# Patient Record
Sex: MALE | Race: WHITE | Employment: OTHER | URBAN - METROPOLITAN AREA
[De-identification: names, ages, dates, MRNs, and addresses within clinical notes are randomized per-mention and may not be internally consistent; named-entity substitution may affect disease eponyms.]

---

## 2017-12-11 PROBLEM — I48.92 ATRIAL FLUTTER (HCC): Status: ACTIVE | Noted: 2017-12-11

## 2017-12-13 PROBLEM — E78.2 HYPERLIPEMIA, MIXED: Chronic | Status: ACTIVE | Noted: 2017-12-13

## 2017-12-13 PROBLEM — I10 HTN (HYPERTENSION), BENIGN: Chronic | Status: ACTIVE | Noted: 2017-12-13

## 2018-10-02 ENCOUNTER — HOSPITAL ENCOUNTER (EMERGENCY)
Age: 78
Discharge: HOME | End: 2018-10-02
Payer: MEDICARE

## 2018-10-02 ENCOUNTER — APPOINTMENT (OUTPATIENT)
Dept: GENERAL RADIOLOGY | Age: 78
End: 2018-10-02
Payer: MEDICARE

## 2018-10-02 VITALS
HEART RATE: 79 BPM | WEIGHT: 164 LBS | OXYGEN SATURATION: 97 % | RESPIRATION RATE: 14 BRPM | SYSTOLIC BLOOD PRESSURE: 168 MMHG | HEIGHT: 70 IN | DIASTOLIC BLOOD PRESSURE: 76 MMHG | BODY MASS INDEX: 23.48 KG/M2 | TEMPERATURE: 97.7 F

## 2018-10-02 DIAGNOSIS — T18.108A FOREIGN BODY IN ESOPHAGUS, INITIAL ENCOUNTER: Primary | ICD-10-CM

## 2018-10-02 PROCEDURE — 99283 EMERGENCY DEPT VISIT LOW MDM: CPT

## 2018-10-02 PROCEDURE — 6370000000 HC RX 637 (ALT 250 FOR IP): Performed by: PHYSICIAN ASSISTANT

## 2018-10-02 PROCEDURE — 6360000004 HC RX CONTRAST MEDICATION: Performed by: PHYSICIAN ASSISTANT

## 2018-10-02 PROCEDURE — 71046 X-RAY EXAM CHEST 2 VIEWS: CPT

## 2018-10-02 PROCEDURE — A4641 RADIOPHARM DX AGENT NOC: HCPCS | Performed by: PHYSICIAN ASSISTANT

## 2018-10-02 PROCEDURE — 2500000003 HC RX 250 WO HCPCS: Performed by: PHYSICIAN ASSISTANT

## 2018-10-02 PROCEDURE — 74220 X-RAY XM ESOPHAGUS 1CNTRST: CPT

## 2018-10-02 RX ORDER — AZITHROMYCIN 250 MG/1
TABLET, FILM COATED ORAL
Qty: 1 PACKET | Refills: 0 | Status: SHIPPED | OUTPATIENT
Start: 2018-10-02 | End: 2018-10-12

## 2018-10-02 RX ADMIN — ANTACID/ANTIFLATULENT 1 EACH: 380; 550; 10; 10 GRANULE, EFFERVESCENT ORAL at 14:27

## 2018-10-02 RX ADMIN — BARIUM SULFATE 1 TABLET: 700 TABLET ORAL at 14:26

## 2018-10-02 RX ADMIN — BARIUM SULFATE 170 ML: 960 POWDER, FOR SUSPENSION ORAL at 14:27

## 2018-10-02 RX ADMIN — BARIUM SULFATE 140 ML: 980 POWDER, FOR SUSPENSION ORAL at 14:28

## 2018-10-02 ASSESSMENT — PAIN SCALES - GENERAL: PAINLEVEL_OUTOF10: 5

## 2018-10-02 ASSESSMENT — PAIN DESCRIPTION - DESCRIPTORS: DESCRIPTORS: DISCOMFORT

## 2018-10-02 ASSESSMENT — PAIN DESCRIPTION - LOCATION: LOCATION: THROAT

## 2018-10-02 NOTE — ED NOTES
This RN witnessed broken up pill pieces in a tissue that the patient coughed up. Pt states he feels better now  That he coughed up the remaining pill. Explained tot he patient that he can take his pills with applesauce or pudding if he finds it hard to swallow. Pt states that he does not have a hard time swallowing pills but today was talking and it went down the wrong tube.       Leeroy López RN  10/02/18 0774

## 2023-04-25 ENCOUNTER — HOSPITAL ENCOUNTER (OUTPATIENT)
Age: 83
Discharge: HOME | End: 2023-04-27

## 2023-04-25 PROCEDURE — 88305 TISSUE EXAM BY PATHOLOGIST: CPT

## 2023-10-05 ENCOUNTER — APPOINTMENT (OUTPATIENT)
Dept: GENERAL RADIOLOGY | Age: 83
End: 2023-10-05
Payer: MEDICARE

## 2023-10-05 ENCOUNTER — HOSPITAL ENCOUNTER (EMERGENCY)
Age: 83
Discharge: HOME | End: 2023-10-05
Attending: EMERGENCY MEDICINE
Payer: MEDICARE

## 2023-10-05 VITALS
BODY MASS INDEX: 23.62 KG/M2 | SYSTOLIC BLOOD PRESSURE: 170 MMHG | OXYGEN SATURATION: 100 % | DIASTOLIC BLOOD PRESSURE: 69 MMHG | HEIGHT: 70 IN | WEIGHT: 165 LBS | HEART RATE: 92 BPM | TEMPERATURE: 97.1 F | RESPIRATION RATE: 16 BRPM

## 2023-10-05 DIAGNOSIS — Z86.79 HX OF ATRIAL FLUTTER: ICD-10-CM

## 2023-10-05 DIAGNOSIS — R00.2 PALPITATIONS: Primary | ICD-10-CM

## 2023-10-05 LAB
ANION GAP SERPL CALCULATED.3IONS-SCNC: 10 MMOL/L (ref 7–16)
BASOPHILS # BLD: 0.02 K/UL (ref 0–0.2)
BASOPHILS NFR BLD: 0 % (ref 0–2)
BNP SERPL-MCNC: 168 PG/ML (ref 0–450)
BUN SERPL-MCNC: 17 MG/DL (ref 6–23)
CALCIUM SERPL-MCNC: 8.9 MG/DL (ref 8.6–10.2)
CHLORIDE SERPL-SCNC: 100 MMOL/L (ref 98–107)
CO2 SERPL-SCNC: 24 MMOL/L (ref 22–29)
CREAT SERPL-MCNC: 0.8 MG/DL (ref 0.7–1.2)
EKG ATRIAL RATE: 89 BPM
EKG P AXIS: 63 DEGREES
EKG P-R INTERVAL: 226 MS
EKG Q-T INTERVAL: 394 MS
EKG QRS DURATION: 128 MS
EKG QTC CALCULATION (BAZETT): 479 MS
EKG R AXIS: -53 DEGREES
EKG T AXIS: 75 DEGREES
EKG VENTRICULAR RATE: 89 BPM
EOSINOPHIL # BLD: 0.06 K/UL (ref 0.05–0.5)
EOSINOPHILS RELATIVE PERCENT: 1 % (ref 0–6)
ERYTHROCYTE [DISTWIDTH] IN BLOOD BY AUTOMATED COUNT: 12 % (ref 11.5–15)
GFR SERPL CREATININE-BSD FRML MDRD: >60 ML/MIN/1.73M2
GLUCOSE SERPL-MCNC: 189 MG/DL (ref 74–99)
HCT VFR BLD AUTO: 38.6 % (ref 37–54)
HGB BLD-MCNC: 13.1 G/DL (ref 12.5–16.5)
IMM GRANULOCYTES # BLD AUTO: <0.03 K/UL (ref 0–0.58)
IMM GRANULOCYTES NFR BLD: 0 % (ref 0–5)
LYMPHOCYTES NFR BLD: 0.77 K/UL (ref 1.5–4)
LYMPHOCYTES RELATIVE PERCENT: 15 % (ref 20–42)
MCH RBC QN AUTO: 31.3 PG (ref 26–35)
MCHC RBC AUTO-ENTMCNC: 33.9 G/DL (ref 32–34.5)
MCV RBC AUTO: 92.1 FL (ref 80–99.9)
MONOCYTES NFR BLD: 0.71 K/UL (ref 0.1–0.95)
MONOCYTES NFR BLD: 14 % (ref 2–12)
NEUTROPHILS NFR BLD: 70 % (ref 43–80)
NEUTS SEG NFR BLD: 3.61 K/UL (ref 1.8–7.3)
PLATELET, FLUORESCENCE: 135 K/UL (ref 130–450)
PMV BLD AUTO: 9.3 FL (ref 7–12)
POTASSIUM SERPL-SCNC: 3.8 MMOL/L (ref 3.5–5)
RBC # BLD AUTO: 4.19 M/UL (ref 3.8–5.8)
SODIUM SERPL-SCNC: 134 MMOL/L (ref 132–146)
TROPONIN I SERPL HS-MCNC: 15 NG/L (ref 0–11)
TROPONIN I SERPL HS-MCNC: 15 NG/L (ref 0–11)
WBC OTHER # BLD: 5.2 K/UL (ref 4.5–11.5)

## 2023-10-05 PROCEDURE — 93010 ELECTROCARDIOGRAM REPORT: CPT | Performed by: INTERNAL MEDICINE

## 2023-10-05 PROCEDURE — 99285 EMERGENCY DEPT VISIT HI MDM: CPT

## 2023-10-05 PROCEDURE — 71045 X-RAY EXAM CHEST 1 VIEW: CPT

## 2023-10-05 PROCEDURE — 93005 ELECTROCARDIOGRAM TRACING: CPT | Performed by: NURSE PRACTITIONER

## 2023-10-05 PROCEDURE — 84484 ASSAY OF TROPONIN QUANT: CPT

## 2023-10-05 PROCEDURE — 83880 ASSAY OF NATRIURETIC PEPTIDE: CPT

## 2023-10-05 PROCEDURE — 85025 COMPLETE CBC W/AUTO DIFF WBC: CPT

## 2023-10-05 PROCEDURE — 80048 BASIC METABOLIC PNL TOTAL CA: CPT

## 2023-10-05 ASSESSMENT — ENCOUNTER SYMPTOMS
CONSTIPATION: 0
NAUSEA: 0
ABDOMINAL PAIN: 0
SHORTNESS OF BREATH: 0
VOMITING: 0
DIARRHEA: 0

## 2023-10-05 NOTE — ED NOTES
Department of Emergency Medicine  FIRST PROVIDER TRIAGE NOTE             Independent MLP           10/5/23  1:17 PM EDT    Date of Encounter: 10/5/23   MRN: [id number]      HPI: Pola Severance is a 80 y.o. male who presents to the ED for Palpitations (States he can feel his heart racing, denies chest pain. Onset 45 mins prior to arrival. Hx of Afib) and Dizziness    Increased weakness over the day. Having palpitations and dizziness. Has a history of A-fib taking Eliquis. ROS: Negative for fever or cough. PE: Gen Appearance/Constitutional: alert  Musculoskeletal: moves all extremities x 4.  In  wheelchair     Initial Plan of Care:patient to room 16  Electronically signed by KENNETH Prado CNP   DD: 10/5/23       KENNETH Prado CNP  10/05/23 8205

## 2023-10-05 NOTE — ED PROVIDER NOTES
Department of Emergency Medicine   ED  Provider Note    Pt Name: Samaria Galindo         MRN: [id number]         [address] [facility] 1940    Admit Date/RoomTime: 10/5/2023  1:17 PM  ED Room: 16/16      Chief Complaint   Patient presents with    Palpitations     States he can feel his heart racing, denies chest pain. Onset 45 mins prior to arrival. Hx of Afib    Dizziness        HPI     Samaria Galindo is a 80 y.o. male with PMHx of HLD, hypertension, atrial flutter, IBS presenting to the ED for palpitations. Patient reports that about an hour ago he had finished eating lunch and noticed he felt lightheaded and that he was having palpitations. Patient feeling weaker than usual.  He denies falls, able to dress by himself, but just overall felt abnormal.  Patient denies chest pain, shortness of breath, abdominal pain, nausea, vomiting. Patient reports diarrhea that has been ongoing. He has a history of IBS. He had polyps removed on colonoscopy in April. He reports he has been having loose stools that are mucousy for 1 week. He has been able to eat and drink like normal, but wonders if he is a little dehydrated from the diarrhea. Patient reports that 1 week ago he finished 5 weeks of prednisone for an ophthalmologic concern. Since being in the ED, the patient's palpitations and lightheadedness have resolved. Patient takes metoprolol succinate and Eliquis for his history of atrial flutter. He reports he has not missed doses of either medicine. Review of Systems   Constitutional:  Negative for chills and fever. Respiratory:  Negative for shortness of breath. Cardiovascular:  Negative for chest pain and leg swelling. Gastrointestinal:  Negative for abdominal pain, constipation, diarrhea, nausea and vomiting. Neurological:  Negative for dizziness, weakness and light-headedness. Physical Exam  Constitutional:       General: He is not in acute distress.   HENT:      Head: Normocephalic and

## 2024-01-07 ENCOUNTER — APPOINTMENT (OUTPATIENT)
Dept: GENERAL RADIOLOGY | Age: 84
End: 2024-01-07
Payer: MEDICARE

## 2024-01-07 ENCOUNTER — HOSPITAL ENCOUNTER (EMERGENCY)
Age: 84
Discharge: HOME | End: 2024-01-07
Attending: EMERGENCY MEDICINE
Payer: MEDICARE

## 2024-01-07 VITALS
OXYGEN SATURATION: 97 % | RESPIRATION RATE: 18 BRPM | HEIGHT: 70 IN | TEMPERATURE: 98.2 F | HEART RATE: 63 BPM | WEIGHT: 164 LBS | BODY MASS INDEX: 23.48 KG/M2 | SYSTOLIC BLOOD PRESSURE: 153 MMHG | DIASTOLIC BLOOD PRESSURE: 73 MMHG

## 2024-01-07 DIAGNOSIS — R00.2 PALPITATIONS: Primary | ICD-10-CM

## 2024-01-07 LAB
ALBUMIN SERPL-MCNC: 4.1 G/DL (ref 3.5–5.2)
ALP SERPL-CCNC: 66 U/L (ref 40–129)
ALT SERPL-CCNC: 13 U/L (ref 0–40)
ANION GAP SERPL CALCULATED.3IONS-SCNC: 9 MMOL/L (ref 7–16)
AST SERPL-CCNC: 20 U/L (ref 0–39)
BASOPHILS # BLD: 0.03 K/UL (ref 0–0.2)
BASOPHILS NFR BLD: 1 % (ref 0–2)
BILIRUB SERPL-MCNC: 0.6 MG/DL (ref 0–1.2)
BUN SERPL-MCNC: 16 MG/DL (ref 6–23)
CALCIUM SERPL-MCNC: 9 MG/DL (ref 8.6–10.2)
CHLORIDE SERPL-SCNC: 99 MMOL/L (ref 98–107)
CO2 SERPL-SCNC: 26 MMOL/L (ref 22–29)
CREAT SERPL-MCNC: 0.8 MG/DL (ref 0.7–1.2)
EOSINOPHIL # BLD: 0.09 K/UL (ref 0.05–0.5)
EOSINOPHILS RELATIVE PERCENT: 2 % (ref 0–6)
ERYTHROCYTE [DISTWIDTH] IN BLOOD BY AUTOMATED COUNT: 11.9 % (ref 11.5–15)
GFR SERPL CREATININE-BSD FRML MDRD: >60 ML/MIN/1.73M2
GLUCOSE SERPL-MCNC: 154 MG/DL (ref 74–99)
HCT VFR BLD AUTO: 40.5 % (ref 37–54)
HGB BLD-MCNC: 13.8 G/DL (ref 12.5–16.5)
IMM GRANULOCYTES # BLD AUTO: <0.03 K/UL (ref 0–0.58)
IMM GRANULOCYTES NFR BLD: 0 % (ref 0–5)
LYMPHOCYTES NFR BLD: 0.77 K/UL (ref 1.5–4)
LYMPHOCYTES RELATIVE PERCENT: 14 % (ref 20–42)
MCH RBC QN AUTO: 31.5 PG (ref 26–35)
MCHC RBC AUTO-ENTMCNC: 34.1 G/DL (ref 32–34.5)
MCV RBC AUTO: 92.5 FL (ref 80–99.9)
MONOCYTES NFR BLD: 0.5 K/UL (ref 0.1–0.95)
MONOCYTES NFR BLD: 9 % (ref 2–12)
NEUTROPHILS NFR BLD: 74 % (ref 43–80)
NEUTS SEG NFR BLD: 4.09 K/UL (ref 1.8–7.3)
PLATELET, FLUORESCENCE: 131 K/UL (ref 130–450)
PMV BLD AUTO: 9.6 FL (ref 7–12)
POTASSIUM SERPL-SCNC: 3.8 MMOL/L (ref 3.5–5)
PROT SERPL-MCNC: 6.9 G/DL (ref 6.4–8.3)
RBC # BLD AUTO: 4.38 M/UL (ref 3.8–5.8)
SODIUM SERPL-SCNC: 134 MMOL/L (ref 132–146)
T4 FREE SERPL-MCNC: 1 NG/DL (ref 0.9–1.7)
TROPONIN I SERPL HS-MCNC: 15 NG/L (ref 0–11)
TROPONIN I SERPL HS-MCNC: 16 NG/L (ref 0–11)
TSH SERPL DL<=0.05 MIU/L-ACNC: 1.87 UIU/ML (ref 0.27–4.2)
WBC OTHER # BLD: 5.5 K/UL (ref 4.5–11.5)

## 2024-01-07 PROCEDURE — 84443 ASSAY THYROID STIM HORMONE: CPT

## 2024-01-07 PROCEDURE — 71045 X-RAY EXAM CHEST 1 VIEW: CPT

## 2024-01-07 PROCEDURE — 84484 ASSAY OF TROPONIN QUANT: CPT

## 2024-01-07 PROCEDURE — 80053 COMPREHEN METABOLIC PANEL: CPT

## 2024-01-07 PROCEDURE — 93005 ELECTROCARDIOGRAM TRACING: CPT

## 2024-01-07 PROCEDURE — 85025 COMPLETE CBC W/AUTO DIFF WBC: CPT

## 2024-01-07 PROCEDURE — 99285 EMERGENCY DEPT VISIT HI MDM: CPT

## 2024-01-07 PROCEDURE — 84439 ASSAY OF FREE THYROXINE: CPT

## 2024-01-07 ASSESSMENT — PAIN SCALES - GENERAL: PAINLEVEL_OUTOF10: 0

## 2024-01-07 ASSESSMENT — LIFESTYLE VARIABLES
HOW OFTEN DO YOU HAVE A DRINK CONTAINING ALCOHOL: NEVER
HOW MANY STANDARD DRINKS CONTAINING ALCOHOL DO YOU HAVE ON A TYPICAL DAY: PATIENT DOES NOT DRINK

## 2024-01-07 NOTE — ED TRIAGE NOTES
hx of atrial fib- on eliquis  sees dr gorman cardiology  feeling missed beat and palpitations x 4 days since 3am felt skipped beats until 5 am

## 2024-01-07 NOTE — ED NOTES
Reviewed discharge instructions with patient, discussed medications and addressed all patient and family questions/concerns. Pt verbalizes understanding.

## 2024-01-07 NOTE — ED PROVIDER NOTES
They have been instructed to follow-up with PCP and cardiology as soon as possible and are provided with strict return precautions as to which they should return to the nearest available emergency department.  Patient acknowledges understanding of all of these instructions and is to be discharged at this time.    EKG obtained during ED visit is noted in the ED course.    Differential diagnoses considered in the workup of this patient include but are not limited to atrial fibrillation, hyperthyroidism, electrolyte abnormality, pneumonia, ACS.    Please see ED Course below for further MDM  ED Course as of 01/08/24 0558   Sun Jan 07, 2024   0610 EKG:  This EKG is signed and interpreted by the EP.    Time: 6:08  Rate: 78  Rhythm: Sinus and with Right BBB  Interpretation: left ventricular hypertrophy  Comparison: stable as compared to patient's most recent EKG   [CF]      ED Course User Index  [CF] Jake Alejandro DO       Procedures: Unless otherwise listed below, none    Chart Review: Chart reviewed from 10/5/2023 emergency department visit.    Social Determinants: Patient has good medical compliance and fluency as well as established follow-up with family physician.      ------------------------- NURSING NOTES AND VITALS REVIEWED ---------------------------  Date / Time Roomed:  1/7/2024  5:53 AM  ED Bed Assignment:  19/19    The nursing notes within the ED encounter and vital signs as below have been reviewed.   Patient Vitals for the past 24 hrs:   BP Temp Pulse Resp SpO2 Height Weight   01/07/24 0944 (!) 153/73 98.2 °F (36.8 °C) 63 18 97 % -- --   01/07/24 0559 -- -- -- -- -- 1.778 m (5' 10\") 74.4 kg (164 lb)       ------------------------- ADDITIONAL PROVIDER NOTES ---------------------------    I have spoken to the patient and/or family regarding results and the proposed plan for disposition.  They are comfortable with management and treatment plans as discussed.      ------------------------- Disposition

## 2024-01-08 LAB
EKG ATRIAL RATE: 78 BPM
EKG P-R INTERVAL: 208 MS
EKG Q-T INTERVAL: 404 MS
EKG QRS DURATION: 128 MS
EKG QTC CALCULATION (BAZETT): 460 MS
EKG R AXIS: -22 DEGREES
EKG T AXIS: 114 DEGREES
EKG VENTRICULAR RATE: 78 BPM

## 2024-01-08 PROCEDURE — 93010 ELECTROCARDIOGRAM REPORT: CPT | Performed by: INTERNAL MEDICINE
